# Patient Record
Sex: MALE | Race: WHITE
[De-identification: names, ages, dates, MRNs, and addresses within clinical notes are randomized per-mention and may not be internally consistent; named-entity substitution may affect disease eponyms.]

---

## 2021-07-02 ENCOUNTER — HOSPITAL ENCOUNTER (EMERGENCY)
Dept: HOSPITAL 63 - ER | Age: 61
Discharge: HOME | End: 2021-07-02
Payer: OTHER GOVERNMENT

## 2021-07-02 VITALS — DIASTOLIC BLOOD PRESSURE: 73 MMHG | SYSTOLIC BLOOD PRESSURE: 129 MMHG

## 2021-07-02 VITALS — BODY MASS INDEX: 27.27 KG/M2 | WEIGHT: 190.48 LBS | HEIGHT: 70 IN

## 2021-07-02 DIAGNOSIS — L25.9: Primary | ICD-10-CM

## 2021-07-02 DIAGNOSIS — L03.311: ICD-10-CM

## 2021-07-02 PROCEDURE — 99283 EMERGENCY DEPT VISIT LOW MDM: CPT

## 2021-07-02 RX ADMIN — SULFAMETHOXAZOLE AND TRIMETHOPRIM ONE TAB: 800; 160 TABLET ORAL at 18:57

## 2021-07-02 NOTE — PHYS DOC
General Adult


HPI:


HPI:


"... I got this rash . it started 4 days ago.. I went UC yesterday.. they 

started me on Keflex  and Prednisone.. but is no better.. now it ulcerating..."





Patient is a 60 year old male who presents with above hx and complaints of rash 

on legs.   Tetanus is less than 5 yrs. Pt. follows with Dr. Kai Moy.  

Patient denies any history immunosuppression.  No recent travel.  No severe ill 

contacts.  May have gotten in contact with poison ivy or poison oak.  Patient 

has ulcerating lesions on his lower shins and some rash along right flank.  

Patient has been using over-the-counter meds and was started on prednisone and 

Keflex yesterday at urgent care.  Patient worried that the rash has not improved

and there is increased swelling and drainage today.





Review of Systems:


Review of Systems:


Constitutional:  Denies fever or chills 


Eyes:  Denies change in visual acuity 


HENT:  Denies nasal congestion or sore throat 


Respiratory:  Denies cough or shortness of breath 


Cardiovascular:  Denies chest pain or edema 


GI:  Denies abdominal pain, nausea, vomiting, bloody stools or diarrhea 


: Denies dysuria 


Musculoskeletal:  Denies back pain or joint pain 


Integument: Complains of rash and skin breakdown


Neurologic:  Denies headache, focal weakness or sensory changes 


Endocrine:  Denies polyuria or polydipsia 


Lymphatic:  Denies swollen glands 


Psychiatric:  Denies depression or anxiety





Family History:


Family History:


Noncontributory to presentation





Current Medications:


Current Meds:


See nursing for home meds





Allergies:


Allergies:


No known drug allergies





Physical Exam:


PE:





Constitutional: Moderate acute distress, non-toxic appearance. []


HENT: Normocephalic, atraumatic, bilateral external ears normal, oropharynx 

moist, no oral exudates, nose normal. []


Eyes: PERRLA, EOMI, conjunctiva normal, no discharge. [] 


Neck: Normal range of motion, no tenderness, supple, no stridor. [] 


Cardiovascular:Heart rate regular rhythm, no murmur []


Lungs & Thorax:  Bilateral breath sounds to apex on auscultation []


Abdomen: Bowel sounds normal, soft, no tenderness, no masses, no pulsatile 

masses. [] 


Skin: Warm, dry, no erythema, blistering ulcerated lash on lower shins and small

 area of rash on right flank


Back: No tenderness, no CVA tenderness. [] 


Extremities: No tenderness, no cyanosis, no clubbing, ROM intact, no edema. [] 


Neurologic: Alert and oriented X 3, normal motor function, normal sensory 

function, no focal deficits noted. []


Psychologic: Affect anxious judgement normal, mood normal. []





EKG:


EKG:


[]





Radiology/Procedures:


Radiology/Procedures:


[]





Heart Score:


C/O Chest Pain:  N/A


Risk Factors:


Risk Factors:  DM, Current or recent (<one month) smoker, HTN, HLP, family 

history of CAD, obesity.


Risk Scores:


Score 0 - 3:  2.5% MACE over next 6 weeks - Discharge Home


Score 4 - 6:  20.3% MACE over next 6 weeks - Admit for Clinical Observation


Score 7 - 10:  72.7% MACE over next 6 weeks - Early Invasive Strategies





Course & Med Decision Making:


Course & Med Decision Making


Pertinent Labs and Imaging studies reviewed. (See chart for details)





Wash area twice a day.  Massage area with polysporin 4 x day.   Continue the 

Prednisone if suspect poison ivy or oak ect.   Will need to take this med 

longer.  Will order a taper of 40 mg x 3 days 30 mg x 3 days 20 mg x 3 days 10 

mg x 3 days 5 mg x 4 days.  Will add Bactrim DS twice a day to antibiotic.  

Continue Keflex for now.  Follow up with primary.  Reexam if no improvement.





Impression:





1.  Contact dermatitis-suspect poison ivy/poison oak


2.  Cellulitis





[]





Dragon Disclaimer:


Dragon Disclaimer:


This electronic medical record was generated, in whole or in part, using a voice

 recognition dictation system.





Departure


Departure:


Referrals:  


KAI MOY MD (PCP)


Scripts


Prednisone (PREDNISONE) 50 Mg Tablet


10 MG PO taper for poison ivy, #50 TAB


   Prov: THOMAS GIANG MD         7/2/21 


Sulfamethoxazole/Trimethoprim (BACTRIM DS TABLET) 1 Each Tablet


1 TAB PO BID for cellulitis for 10 Days, #20 TAB 0 Refills


   Prov: THOMAS GIANG MD         7/2/21





Dragon Disclaimer


This chart was dictated in whole or in part using Voice Recognition software in 

a busy, high-work load, and often noisy Emergency Department environment.  It 

may contain unintended and wholly unrecognized errors or omissions.











THOMAS GIANG MD            Jul 2, 2021 17:45